# Patient Record
Sex: MALE | Race: OTHER | Employment: OTHER | URBAN - METROPOLITAN AREA
[De-identification: names, ages, dates, MRNs, and addresses within clinical notes are randomized per-mention and may not be internally consistent; named-entity substitution may affect disease eponyms.]

---

## 2019-03-15 ENCOUNTER — HOSPITAL ENCOUNTER (EMERGENCY)
Age: 24
Discharge: HOME | End: 2019-03-15
Attending: EMERGENCY MEDICINE
Payer: OTHER GOVERNMENT

## 2019-03-15 ENCOUNTER — APPOINTMENT (OUTPATIENT)
Dept: GENERAL RADIOLOGY | Age: 24
End: 2019-03-15
Attending: EMERGENCY MEDICINE
Payer: OTHER GOVERNMENT

## 2019-03-15 VITALS
HEART RATE: 87 BPM | TEMPERATURE: 98 F | DIASTOLIC BLOOD PRESSURE: 89 MMHG | OXYGEN SATURATION: 97 % | WEIGHT: 230 LBS | SYSTOLIC BLOOD PRESSURE: 143 MMHG | RESPIRATION RATE: 16 BRPM | HEIGHT: 70 IN | BODY MASS INDEX: 32.93 KG/M2

## 2019-03-15 DIAGNOSIS — R51.9 HEADACHE, UNSPECIFIED HEADACHE TYPE: ICD-10-CM

## 2019-03-15 DIAGNOSIS — R07.9 ACUTE CHEST PAIN: Primary | ICD-10-CM

## 2019-03-15 LAB
ATRIAL RATE: 78 BPM
CALCULATED P AXIS, ECG09: 28 DEGREES
CALCULATED R AXIS, ECG10: 63 DEGREES
CALCULATED T AXIS, ECG11: 39 DEGREES
DIAGNOSIS, 93000: NORMAL
FLUAV AG NPH QL IA: NEGATIVE
FLUBV AG NOSE QL IA: NEGATIVE
P-R INTERVAL, ECG05: 170 MS
Q-T INTERVAL, ECG07: 374 MS
QRS DURATION, ECG06: 90 MS
QTC CALCULATION (BEZET), ECG08: 426 MS
TROPONIN I BLD-MCNC: <0.04 NG/ML (ref 0–0.08)
VENTRICULAR RATE, ECG03: 78 BPM

## 2019-03-15 PROCEDURE — 71046 X-RAY EXAM CHEST 2 VIEWS: CPT

## 2019-03-15 PROCEDURE — 74011250637 HC RX REV CODE- 250/637: Performed by: EMERGENCY MEDICINE

## 2019-03-15 PROCEDURE — 99284 EMERGENCY DEPT VISIT MOD MDM: CPT

## 2019-03-15 PROCEDURE — 84484 ASSAY OF TROPONIN QUANT: CPT

## 2019-03-15 PROCEDURE — 87804 INFLUENZA ASSAY W/OPTIC: CPT

## 2019-03-15 PROCEDURE — 93005 ELECTROCARDIOGRAM TRACING: CPT

## 2019-03-15 RX ORDER — MAG HYDROX/ALUMINUM HYD/SIMETH 200-200-20
30 SUSPENSION, ORAL (FINAL DOSE FORM) ORAL
Status: COMPLETED | OUTPATIENT
Start: 2019-03-15 | End: 2019-03-15

## 2019-03-15 RX ORDER — ONDANSETRON 4 MG/1
4 TABLET, ORALLY DISINTEGRATING ORAL
Qty: 10 TAB | Refills: 0 | Status: SHIPPED | OUTPATIENT
Start: 2019-03-15 | End: 2020-01-07

## 2019-03-15 RX ORDER — FAMOTIDINE 20 MG/1
20 TABLET, FILM COATED ORAL
Status: COMPLETED | OUTPATIENT
Start: 2019-03-15 | End: 2019-03-15

## 2019-03-15 RX ORDER — IBUPROFEN 800 MG/1
800 TABLET ORAL
Qty: 20 TAB | Refills: 0 | Status: SHIPPED | OUTPATIENT
Start: 2019-03-15 | End: 2019-03-22

## 2019-03-15 RX ORDER — ACETAMINOPHEN 500 MG
1000 TABLET ORAL
Qty: 40 TAB | Refills: 0 | Status: SHIPPED | OUTPATIENT
Start: 2019-03-15

## 2019-03-15 RX ORDER — IBUPROFEN 600 MG/1
600 TABLET ORAL
Status: COMPLETED | OUTPATIENT
Start: 2019-03-15 | End: 2019-03-15

## 2019-03-15 RX ORDER — ACETAMINOPHEN 500 MG
1000 TABLET ORAL
Status: COMPLETED | OUTPATIENT
Start: 2019-03-15 | End: 2019-03-15

## 2019-03-15 RX ADMIN — ACETAMINOPHEN 1000 MG: 500 TABLET, FILM COATED ORAL at 02:24

## 2019-03-15 RX ADMIN — ALUMINUM HYDROXIDE, MAGNESIUM HYDROXIDE, AND SIMETHICONE 30 ML: 200; 200; 20 SUSPENSION ORAL at 02:24

## 2019-03-15 RX ADMIN — IBUPROFEN 600 MG: 600 TABLET, FILM COATED ORAL at 02:24

## 2019-03-15 RX ADMIN — FAMOTIDINE 20 MG: 20 TABLET ORAL at 02:24

## 2019-03-15 NOTE — ED PROVIDER NOTES
Frankey Friar is a 20 y.o. Male is is active [employer] is otherwise healthy with complaints of onset of nausea and vomiting chest discomfort not feeling well that occurred after work yesterday. Patient states he still has some chest discomfort which went to his back. The pain is now better. He denies any current nausea or vomiting abdominal pain. He does have a mild headache as well. He denies any sore throat congestion runny nose, cough. No urinary symptoms. Patient was just concerned because he did not want the symptoms returned again. The history is provided by the patient. History reviewed. No pertinent past medical history. History reviewed. No pertinent surgical history. History reviewed. No pertinent family history. Social History     Socioeconomic History    Marital status: SINGLE     Spouse name: Not on file    Number of children: Not on file    Years of education: Not on file    Highest education level: Not on file   Social Needs    Financial resource strain: Not on file    Food insecurity - worry: Not on file    Food insecurity - inability: Not on file    Transportation needs - medical: Not on file   [employer] needs - non-medical: Not on file   Occupational History    Not on file   Tobacco Use    Smoking status: Never Smoker    Smokeless tobacco: Never Used   Substance and Sexual Activity    Alcohol use: No     Frequency: Never    Drug use: No    Sexual activity: Not on file   Other Topics Concern    Not on file   Social History Narrative    Not on file         ALLERGIES: Patient has no known allergies. Review of Systems   Constitutional: Negative for chills, diaphoresis and fever. HENT: Negative for sore throat and trouble swallowing. Eyes: Negative for visual disturbance. Respiratory: Negative for cough and shortness of breath. Cardiovascular: Positive for chest pain.    Gastrointestinal: Negative for abdominal pain.   Endocrine: Negative for polyuria. Genitourinary: Negative for difficulty urinating and dysuria. Musculoskeletal: Negative for gait problem. Skin: Negative for rash. Allergic/Immunologic: Negative for immunocompromised state. Neurological: Negative for syncope. Psychiatric/Behavioral: Positive for sleep disturbance. Vitals:    03/15/19 0127   BP: 143/89   Pulse: 87   Resp: 16   Temp: 98 °F (36.7 °C)   SpO2: 97%   Weight: 104.3 kg (230 lb)   Height: 5' 10\" (1.778 m)            Physical Exam   Constitutional: He is oriented to person, place, and time. He appears well-developed and well-nourished. Non-toxic appearance. He does not appear ill. No distress. HENT:   Head: Normocephalic and atraumatic. Right Ear: External ear normal.   Left Ear: External ear normal.   Nose: Nose normal.   Mouth/Throat: Oropharynx is clear and moist. No oropharyngeal exudate. Eyes: Conjunctivae and EOM are normal. Pupils are equal, round, and reactive to light. Right eye exhibits no discharge. Left eye exhibits no discharge. No scleral icterus. Neck: Normal range of motion. Cardiovascular: Normal rate, regular rhythm, normal heart sounds and intact distal pulses. Pulmonary/Chest: Effort normal and breath sounds normal. No respiratory distress. Abdominal: Soft. There is no tenderness. Musculoskeletal: Normal range of motion. He exhibits no edema. Lymphadenopathy:     He has no cervical adenopathy. Neurological: He is alert and oriented to person, place, and time. Skin: Skin is warm and dry. Capillary refill takes less than 2 seconds. He is not diaphoretic. Psychiatric: His behavior is normal.   Nursing note and vitals reviewed.        MDM       Procedures  Vitals:  Patient Vitals for the past 12 hrs:   Temp Pulse Resp BP SpO2   03/15/19 0127 98 °F (36.7 °C) 87 16 143/89 97 %         Medications ordered:   Medications   ibuprofen (MOTRIN) tablet 600 mg (600 mg Oral Given 3/15/19 0224) acetaminophen (TYLENOL) tablet 1,000 mg (1,000 mg Oral Given 3/15/19 0224)   alum-mag hydroxide-simeth (MYLANTA) oral suspension 30 mL (30 mL Oral Given 3/15/19 0224)   famotidine (PEPCID) tablet 20 mg (20 mg Oral Given 3/15/19 0224)         Lab findings:  Recent Results (from the past 12 hour(s))   POC TROPONIN-I    Collection Time: 03/15/19  2:33 AM   Result Value Ref Range    Troponin-I (POC) <0.04 0.00 - 0.08 ng/mL       EKG interpretation by ED Physician:  Normal sinus rhythm with early repolarization. Rate 78  QTc 426  No previous    X-Ray, CT or other radiology findings or impressions:  XR CHEST PA LAT    (Results Pending)     Normal per my interpretation  Progress notes, Consult notes or additional Procedure notes:   Doubt primary cardiac issue. Most likely consistent with a viral illness. Doubt need for further workup. I have discussed with patient and/or family/sig other the results, interpretation of any imaging if performed, suspected diagnosis and treatment plan to include instructions regarding the diagnoses listed to which understanding was expressed with all questions answered      Reevaluation of patient:   stable    Disposition:  Diagnosis:   1. Acute chest pain    2. Headache, unspecified headache type        Disposition: home      Follow-up Information     Follow up With Specialties Details Why Contact Info    follow up with [facility] medical section tomorrow for recheck                   Medication List      START taking these medications    acetaminophen 500 mg tablet  Commonly known as:  TYLENOL EXTRA STRENGTH  Take 2 Tabs by mouth every six (6) hours as needed for Pain. ibuprofen 800 mg tablet  Commonly known as:  MOTRIN  Take 1 Tab by mouth every eight (8) hours as needed for Pain (with food) for up to 7 days. ondansetron 4 mg disintegrating tablet  Commonly known as:  ZOFRAN ODT  Take 1 Tab by mouth every eight (8) hours as needed for Nausea.            Where to Get Your Medications      Information about where to get these medications is not yet available    Ask your nurse or doctor about these medications  · acetaminophen 500 mg tablet  · ibuprofen 800 mg tablet  · ondansetron 4 mg disintegrating tablet

## 2019-03-15 NOTE — ED NOTES
I have reviewed discharge instructions with the patient. The patient verbalized understanding. Patient armband removed and shredded    Patient ambulatory to ED lobby.

## 2019-03-15 NOTE — DISCHARGE INSTRUCTIONS
Patient Education        Chest Pain: Care Instructions  Your Care Instructions    There are many things that can cause chest pain. Some are not serious and will get better on their own in a few days. But some kinds of chest pain need more testing and treatment. Your doctor may have recommended a follow-up visit in the next 8 to 12 hours. If you are not getting better, you may need more tests or treatment. Even though your doctor has released you, you still need to watch for any problems. The doctor carefully checked you, but sometimes problems can develop later. If you have new symptoms or if your symptoms do not get better, get medical care right away. If you have worse or different chest pain or pressure that lasts more than 5 minutes or you passed out (lost consciousness), call 911 or seek other emergency help right away. A medical visit is only one step in your treatment. Even if you feel better, you still need to do what your doctor recommends, such as going to all suggested follow-up appointments and taking medicines exactly as directed. This will help you recover and help prevent future problems. How can you care for yourself at home? · Rest until you feel better. · Take your medicine exactly as prescribed. Call your doctor if you think you are having a problem with your medicine. · Do not drive after taking a prescription pain medicine. When should you call for help? Call 911 if:    · You passed out (lost consciousness).     · You have severe difficulty breathing.     · You have symptoms of a heart attack. These may include:  ? Chest pain or pressure, or a strange feeling in your chest.  ? Sweating. ? Shortness of breath. ? Nausea or vomiting. ? Pain, pressure, or a strange feeling in your back, neck, jaw, or upper belly or in one or both shoulders or arms. ? Lightheadedness or sudden weakness. ? A fast or irregular heartbeat.   After you call 911, the  may tell you to chew 1 adult-strength or 2 to 4 low-dose aspirin. Wait for an ambulance. Do not try to drive yourself.    Call your doctor today if:    · You have any trouble breathing.     · Your chest pain gets worse.     · You are dizzy or lightheaded, or you feel like you may faint.     · You are not getting better as expected.     · You are having new or different chest pain. Where can you learn more? Go to http://patricia-ella.info/. Enter A120 in the search box to learn more about \"Chest Pain: Care Instructions. \"  Current as of: September 23, 2018  Content Version: 11.9  © 2931-8127 [employer]. Care instructions adapted under license by [employer] (which disclaims liability or warranty for this information). If you have questions about a medical condition or this instruction, always ask your healthcare professional. [place] [address] any warranty or liability for your use of this information. Patient Education        Headache: Care Instructions  Your Care Instructions    Headaches have many possible causes. Most headaches aren't a sign of a more serious problem, and they will get better on their own. Home treatment may help you feel better faster. The doctor has checked you carefully, but problems can develop later. If you notice any problems or new symptoms, get medical treatment right away. Follow-up care is a key part of your treatment and safety. Be sure to make and go to all appointments, and call your doctor if you are having problems. It's also a good idea to know your test results and keep a list of the medicines you take. How can you care for yourself at home? · Do not drive if you have taken a prescription pain medicine. · Rest in a quiet, dark room until your headache is gone. Close your eyes and try to relax or go to sleep. Don't watch TV or read. · Put a cold, moist cloth or cold pack on the painful area for 10 to 20 minutes at a time.  Put a thin cloth between the cold pack and your skin. · Use a warm, moist towel or a heating pad set on low to relax tight shoulder and neck muscles. · Have someone gently massage your neck and shoulders. · Take pain medicines exactly as directed. ? If the doctor gave you a prescription medicine for pain, take it as prescribed. ? If you are not taking a prescription pain medicine, ask your doctor if you can take an over-the-counter medicine. · Be careful not to take pain medicine more often than the instructions allow, because you may get worse or more frequent headaches when the medicine wears off. · Do not ignore new symptoms that occur with a headache, such as a fever, weakness or numbness, vision changes, or confusion. These may be signs of a more serious problem. To prevent headaches  · Keep a headache diary so you can figure out what triggers your headaches. Avoiding triggers may help you prevent headaches. Record when each headache began, how long it lasted, and what the pain was like (throbbing, aching, stabbing, or dull). Write down any other symptoms you had with the headache, such as nausea, flashing lights or dark spots, or sensitivity to bright light or loud noise. Note if the headache occurred near your period. List anything that might have triggered the headache, such as certain foods (chocolate, cheese, wine) or odors, smoke, bright light, stress, or lack of sleep. · Find healthy ways to deal with stress. Headaches are most common during or right after stressful times. Take time to relax before and after you do something that has caused a headache in the past.  · Try to keep your muscles relaxed by keeping good posture. Check your jaw, face, neck, and shoulder muscles for tension, and try relaxing them. When sitting at a desk, change positions often, and stretch for 30 seconds each hour. · Get plenty of sleep and exercise. · Eat regularly and well.  Long periods without food can trigger a headache. · Treat yourself to a massage. Some people find that regular massages are very helpful in relieving tension. · Limit caffeine by not drinking too much coffee, tea, or soda. But don't quit caffeine suddenly, because that can also give you headaches. · Reduce eyestrain from computers by blinking frequently and looking away from the computer screen every so often. Make sure you have proper eyewear and that your monitor is set up properly, about an arm's length away. · Seek help if you have depression or anxiety. Your headaches may be linked to these conditions. Treatment can both prevent headaches and help with symptoms of anxiety or depression. When should you call for help? Call 911 anytime you think you may need emergency care. For example, call if:    · You have signs of a stroke. These may include:  ? Sudden numbness, paralysis, or weakness in your face, arm, or leg, especially on only one side of your body. ? Sudden vision changes. ? Sudden trouble speaking. ? Sudden confusion or trouble understanding simple statements. ? Sudden problems with walking or balance. ? A sudden, severe headache that is different from past headaches.    Call your doctor now or seek immediate medical care if:    · You have a new or worse headache.     · Your headache gets much worse. Where can you learn more? Go to http://patricia-ella.info/. Enter M271 in the search box to learn more about \"Headache: Care Instructions. \"  Current as of: Lennie 3, 2018  Content Version: 11.9  © 7706-1189 [employer]. Care instructions adapted under license by [employer] (which disclaims liability or warranty for this information). If you have questions about a medical condition or this instruction, always ask your healthcare professional. [place] [address] any warranty or liability for your use of this information.

## 2019-03-15 NOTE — LETTER
NOTIFICATION RETURN TO WORK / SCHOOL 
 
3/15/2019 2:53 AM 
 
Mr. Rupa SOUZA SHWETA Antonio [id number] [facility] [phone] To Whom It May Concern: 
 
Rupa Vogel is currently under the care of [facility] EMERGENCY DEPT. He will return to work/school on: 3/16/19 If there are questions or concerns please have the patient contact our office. Sincerely, Seymour Camarillo MD

## 2019-05-03 ENCOUNTER — APPOINTMENT (OUTPATIENT)
Dept: CT IMAGING | Age: 24
End: 2019-05-03
Attending: PHYSICIAN ASSISTANT
Payer: OTHER GOVERNMENT

## 2019-05-03 ENCOUNTER — HOSPITAL ENCOUNTER (EMERGENCY)
Age: 24
Discharge: HOME | End: 2019-05-03
Attending: EMERGENCY MEDICINE
Payer: OTHER GOVERNMENT

## 2019-05-03 VITALS
TEMPERATURE: 98.3 F | HEART RATE: 81 BPM | SYSTOLIC BLOOD PRESSURE: 133 MMHG | OXYGEN SATURATION: 99 % | DIASTOLIC BLOOD PRESSURE: 70 MMHG | RESPIRATION RATE: 16 BRPM

## 2019-05-03 DIAGNOSIS — S30.0XXA HEMATOMA OF LUMBAR MUSCLE, INITIAL ENCOUNTER: Primary | ICD-10-CM

## 2019-05-03 DIAGNOSIS — M54.50 ACUTE LEFT-SIDED LOW BACK PAIN WITHOUT SCIATICA: ICD-10-CM

## 2019-05-03 LAB
ANION GAP BLD CALC-SCNC: 20 MMOL/L (ref 10–20)
APTT PPP: 25.4 SEC (ref 23–36.4)
BUN BLD-MCNC: 16 MG/DL (ref 7–18)
CA-I BLD-MCNC: 1.2 MMOL/L (ref 1.12–1.32)
CHLORIDE BLD-SCNC: 101 MMOL/L (ref 100–108)
CO2 BLD-SCNC: 28 MMOL/L (ref 19–24)
CREAT UR-MCNC: 1.1 MG/DL (ref 0.6–1.3)
GLUCOSE BLD STRIP.AUTO-MCNC: 65 MG/DL (ref 74–106)
HCT VFR BLD CALC: 40 % (ref 36–49)
HGB BLD-MCNC: 13.6 G/DL (ref 12–16)
INR PPP: 1.1 (ref 0.8–1.2)
POTASSIUM BLD-SCNC: 3.7 MMOL/L (ref 3.5–5.5)
PROTHROMBIN TIME: 13.6 SEC (ref 11.5–15.2)
SODIUM BLD-SCNC: 144 MMOL/L (ref 136–145)

## 2019-05-03 PROCEDURE — 99283 EMERGENCY DEPT VISIT LOW MDM: CPT

## 2019-05-03 PROCEDURE — 80047 BASIC METABLC PNL IONIZED CA: CPT

## 2019-05-03 PROCEDURE — 74011636320 HC RX REV CODE- 636/320: Performed by: EMERGENCY MEDICINE

## 2019-05-03 PROCEDURE — 85610 PROTHROMBIN TIME: CPT

## 2019-05-03 PROCEDURE — 85730 THROMBOPLASTIN TIME PARTIAL: CPT

## 2019-05-03 PROCEDURE — 74177 CT ABD & PELVIS W/CONTRAST: CPT

## 2019-05-03 RX ORDER — NAPROXEN 500 MG/1
500 TABLET ORAL 2 TIMES DAILY WITH MEALS
Qty: 20 TAB | Refills: 0 | Status: SHIPPED | OUTPATIENT
Start: 2019-05-03 | End: 2019-05-13

## 2019-05-03 RX ORDER — CYCLOBENZAPRINE HCL 5 MG
5 TABLET ORAL
Qty: 5 TAB | Refills: 0 | Status: SHIPPED | OUTPATIENT
Start: 2019-05-03 | End: 2019-05-08

## 2019-05-03 RX ADMIN — IOPAMIDOL 93 ML: 612 INJECTION, SOLUTION INTRAVENOUS at 15:09

## 2019-05-03 NOTE — DISCHARGE INSTRUCTIONS
Patient Education        Back Pain: Care Instructions  Your Care Instructions    Back pain has many possible causes. It is often related to problems with muscles and ligaments of the back. It may also be related to problems with the nerves, discs, or bones of the back. Moving, lifting, standing, sitting, or sleeping in an awkward way can strain the back. Sometimes you don't notice the injury until later. Arthritis is another common cause of back pain. Although it may hurt a lot, back pain usually improves on its own within several weeks. Most people recover in 12 weeks or less. Using good home treatment and being careful not to stress your back can help you feel better sooner. Follow-up care is a key part of your treatment and safety. Be sure to make and go to all appointments, and call your doctor if you are having problems. It's also a good idea to know your test results and keep a list of the medicines you take. How can you care for yourself at home? · Sit or lie in positions that are most comfortable and reduce your pain. Try one of these positions when you lie down:  ? Lie on your back with your knees bent and supported by large pillows. ? Lie on the floor with your legs on the seat of a sofa or chair. ? Lie on your side with your knees and hips bent and a pillow between your legs. ? Lie on your stomach if it does not make pain worse. · Do not sit up in bed, and avoid soft couches and twisted positions. Bed rest can help relieve pain at first, but it delays healing. Avoid bed rest after the first day of back pain. · Change positions every 30 minutes. If you must sit for long periods of time, take breaks from sitting. Get up and walk around, or lie in a comfortable position. · Try using a heating pad on a low or medium setting for 15 to 20 minutes every 2 or 3 hours. Try a warm shower in place of one session with the heating pad. · You can also try an ice pack for 10 to 15 minutes every 2 to 3 hours. Put a thin cloth between the ice pack and your skin. · Take pain medicines exactly as directed. ? If the doctor gave you a prescription medicine for pain, take it as prescribed. ? If you are not taking a prescription pain medicine, ask your doctor if you can take an over-the-counter medicine. · Take short walks several times a day. You can start with 5 to 10 minutes, 3 or 4 times a day, and work up to longer walks. Walk on level surfaces and avoid hills and stairs until your back is better. · Return to work and other activities as soon as you can. Continued rest without activity is usually not good for your back. · To prevent future back pain, do exercises to stretch and strengthen your back and stomach. Learn how to use good posture, safe lifting techniques, and proper body mechanics. When should you call for help? Call your doctor now or seek immediate medical care if:    · You have new or worsening numbness in your legs.     · You have new or worsening weakness in your legs. (This could make it hard to stand up.)     · You lose control of your bladder or bowels.    Watch closely for changes in your health, and be sure to contact your doctor if:    · You have a fever, lose weight, or don't feel well.     · You do not get better as expected. Where can you learn more? Go to http://patricia-ella.info/. Enter X967 in the search box to learn more about \"Back Pain: Care Instructions. \"  Current as of: September 20, 2018  Content Version: 11.9  © 2439-3970 [employer]. Care instructions adapted under license by [employer] (which disclaims liability or warranty for this information). If you have questions about a medical condition or this instruction, always ask your healthcare professional. [place] [address] any warranty or liability for your use of this information.          Patient Education        Low Back Contusion: Care Instructions  Your Care Instructions    Contusion is the medical term for a bruise. When you have a low back bruise, it's often caused by a direct blow or an impact, such as falling against a counter or table. Bruises are common sports injuries. Most people think of a bruise as a black-and-blue spot. This happens when small blood vessels get torn and leak blood under the skin. But bones, muscles, and organs can also get bruised. If these deep tissues are damaged, you may not always see a bruise. The doctor will examine your bruise. You may also have tests to make sure you do not have a more serious injury, such as a broken bone or nerve damage. Tests may include X-rays or other imaging tests like a CT scan or MRI. Low back bruises may cause pain and swelling. But if there is no serious damage, they will often get better with home treatment in several days to a few weeks. The doctor has checked you carefully, but problems can develop later. If you notice any problems or new symptoms, get medical treatment right away. Follow-up care is a key part of your treatment and safety. Be sure to make and go to all appointments, and call your doctor if you are having problems. It's also a good idea to know your test results and keep a list of the medicines you take. How can you care for yourself at home? · Put ice or a cold pack on the sore area for 10 to 20 minutes at a time to stop swelling. Put a thin cloth between the ice pack and your skin. · Be safe with medicines. Read and follow all instructions on the label. ? If the doctor gave you a prescription medicine for pain, take it as prescribed. ? If you are not taking a prescription pain medicine, ask your doctor if you can take an over-the-counter medicine. · For the first day or two of pain, take it easy. But as soon as possible, get back to your normal daily life and activities. · Get gentle exercise, such as walking.  Movement keeps your spine flexible and helps your muscles stay strong. When should you call for help? Call 911 anytime you think you may need emergency care. For example, call if:    · You are unable to move a leg at all.   [facility] your doctor now or seek immediate medical care if:    · You have new or worse symptoms in your legs or buttocks. Symptoms may include:  ? Numbness or tingling. ? Weakness. ? Pain.     · You lose bladder or bowel control.     · You have blood in your urine.    Watch closely for changes in your health, and be sure to contact your doctor if:    · You do not get better as expected. Where can you learn more? Go to http://patricia-ella.info/. Enter V337 in the search box to learn more about \"Low Back Contusion: Care Instructions. \"  Current as of: September 23, 2018  Content Version: 11.9  © 0760-1938 [employer]. Care instructions adapted under license by [employer] (which disclaims liability or warranty for this information). If you have questions about a medical condition or this instruction, always ask your healthcare professional. [place] [address] any warranty or liability for your use of this information. Patient Education        Learning About Low Back Pain  What is low back pain? Low back pain is pain that can occur anywhere below the ribs and above the legs. It is very common. Almost everyone has it at one time or another. Low back pain can be:  Acute. This is new pain that can last a few days to a few weeks--at the most a few months. Chronic. This pain can last for more than a few months. Sometimes it can last for years. What are some myths about low back pain? Here are some common myths about low back pain--and the facts:  Myth: \"I need to rest my back when I have back pain. \"  Fact: Staying active won't hurt you. It may help you get better faster. Myth: \"I need prescription pain medicine. \"  Fact: It's best to try to let time and being active heal your back. Opioid pain medicines--such as hydrocodone or oxycodone--usually don't work any better than over-the-counter medicines like ibuprofen or naproxen. And opioids can cause serious problems like addiction or overdose. Myth: \"I need a test like an X-ray or an MRI to diagnose my low back pain. \"  Fact: Getting a test right away won't help you get better faster. And it could lead you down a treatment path you may not need, since most people get better on their own. What causes low back pain? In most cases, there isn't a clear cause. This can be frustrating, because your back hurts and there's no obvious reason. Your back pain can be caused by:  Overuse or muscle strain. This can happen from playing sports, lifting heavy things, or not being physically fit. A herniated disc. This is a problem with the cushion between the bones in your back. Arthritis. With age, you may have changes in your bones that can narrow the space around your nerves. Other causes. In rare cases, the cause is a serious illness like an infection or cancer. But there are usually other symptoms too. What are the symptoms? Your symptoms depend on your body and the cause of your back pain. You may feel:  · Pain that's sharp or dull. It may be in one small area or over a broad area. But even bad pain doesn't mean that it's caused by something serious. · Leg pain, numbness, or tingling. When a nerve gets squeezed--such as from a disc problem or arthritis--you may have symptoms in your leg or foot. You can even have leg symptoms from a back problem without having any pain in your back. How is low back pain diagnosed? A physical exam is the main way to diagnose low back pain. Your doctor may examine your back, check your nerves by testing your reflexes, and make sure that your muscles are strong. He or she also will ask questions about your back and overall health. Most people don't need any tests right away.  Tests often don't show the reason for your pain. If your pain lasts more than 6 weeks or you have symptoms that your doctor is more concerned about, he or she may order tests. These may include an X-ray, a CT scan, or an MRI. In some cases, tests can help your doctor find a cause for your pain, especially for pain in one or both legs. How is low back pain treated? Most acute low back pain gets better on its own within a few weeks, no matter what the cause. Time and doing usual activities are all that most people need to feel better. Using heat or ice and taking over-the-counter pain medicine also can help while your body heals. If you aren't getting better on your own or your pain is very bad, your doctor may recommend:  · Physical therapy. · Spinal manipulation, such as by a chiropractor. · Acupuncture. · Massage. · Injections of steroid medicine in your back (especially for pain that involves your legs). If you have chronic low back pain, treatment will help you understand and manage your pain. Treatment may include:  · Staying active. This may include walking or doing back exercises. · Physical therapy. · Medicines. Some of these medicines are also used for other problems, like depression. · Pain management. Your doctor may have you see a pain specialist.  · Counseling. Having chronic pain can be hard. It may help to talk to someone who can help you cope with your pain. Surgery isn't needed for most people. But it may help some types of low back pain. Follow-up care is a key part of your treatment and safety. Be sure to make and go to all appointments, and call your doctor if you are having problems. It's also a good idea to know your test results and keep a list of the medicines you take. When should you call for help? Call 911 anytime you think you may need emergency care. For example, call if:  · You can't move a leg at all.   Call your doctor now or seek immediate medical care if:  · You have new or worse symptoms in your legs, belly, or buttocks. Symptoms may include:  ? Numbness or tingling. ? Weakness. ? Pain. · You lose bladder or bowel control. Watch closely for changes in your health, and be sure to contact your doctor if:  · Along with the back pain, you have a fever, lose weight, or don't feel well. · You do not get better as expected. Where can you learn more? Go to http://patricia-ella.info/. Enter A007 in the search box to learn more about \"Learning About Low Back Pain. \"  Current as of: September 20, 2018  Content Version: 11.9  © 9930-4865 [employer]. Care instructions adapted under license by [employer] (which disclaims liability or warranty for this information). If you have questions about a medical condition or this instruction, always ask your healthcare professional. [address] any warranty or liability for your use of this information.

## 2019-05-03 NOTE — ED NOTES
Assume care of patient, patient with swelling to right tonsil area, patient able to swallow water and patient not having any problems maintaining his airway

## 2019-05-03 NOTE — LETTER
NOTIFICATION RETURN TO WORK / SCHOOL 
 
5/3/2019 3:55 PM 
 
Mr. Sary Garcia [id number] [facility] [id number] To Whom It May Concern: 
 
Sary Garcia is currently under the care of [facility] EMERGENCY DEPT. He will return to work/school on: 5/6/19 If there are questions or concerns please have the patient contact our office. Sincerely, Jalen Parish PA-C

## 2019-05-03 NOTE — ED NOTES
Patient reading at this time, no difficulty with airway, swelling has not improves or worsened at this time

## 2019-05-08 NOTE — ED PROVIDER NOTES
EMERGENCY DEPARTMENT HISTORY AND PHYSICAL EXAM 
 
Date: 5/3/2019 Patient Name: Laura Mack History of Presenting Illness Chief Complaint Patient presents with  Back Pain History Provided By: patient Additional History (Context): Laura Mack is a 78-year-old male here with left lower back pain after doing dead weights yesterday. Patient states he noticed a bulge to this area after exercising and has had pain since. No radiation of pain, no numbness tingling, saddle anesthesia. Patient states he has never had this type of pain before. Has not taken any medications prior to ED arrival.  No other complaints at this time PCP: None Current Outpatient Medications Medication Sig Dispense Refill  cyclobenzaprine (FLEXERIL) 5 mg tablet Take 1 Tab by mouth nightly for 5 days. 5 Tab 0  
 naproxen (NAPROSYN) 500 mg tablet Take 1 Tab by mouth two (2) times daily (with meals) for 10 days. 20 Tab 0  
 acetaminophen (TYLENOL EXTRA STRENGTH) 500 mg tablet Take 2 Tabs by mouth every six (6) hours as needed for Pain. 40 Tab 0  
 ondansetron (ZOFRAN ODT) 4 mg disintegrating tablet Take 1 Tab by mouth every eight (8) hours as needed for Nausea. 10 Tab 0 Past History Past Medical History: No past medical history on file. Past Surgical History: No past surgical history on file. Family History: No family history on file. Social History: 
Social History Tobacco Use  Smoking status: Never Smoker  Smokeless tobacco: Never Used Substance Use Topics  Alcohol use: No  
  Frequency: Never  Drug use: No  
 
 
Allergies: 
No Known Allergies Review of Systems Review of Systems Constitutional: Negative for chills and fever. HENT: Negative for nasal congestion, sore throat, rhinorrhea Eyes: Negative. Respiratory: Negative for cough and negative for shortness of breath. Cardiovascular: Negative for chest pain and palpitations. Gastrointestinal: Negative for abdominal pain, constipation, diarrhea, nausea and vomiting. Genitourinary: Negative. Negative for difficulty urinating and flank pain. Musculoskeletal: Negative for back pain. Negative for gait problem and neck pain. Positive for muscle pain Skin: Negative. Allergic/Immunologic: Negative. Neurological: Negative for dizziness, weakness, numbness and headaches. Psychiatric/Behavioral: Negative. All other systems reviewed and are negative. All Other Systems Negative Physical Exam  
 
Vitals:  
 05/03/19 1323 05/03/19 1530 BP: 135/71 133/70 Pulse: 83 81 Resp: 16 16 Temp: 98.2 °F (36.8 °C) 98.3 °F (36.8 °C) SpO2: 98% 99% Physical Exam  
Constitutional: He is oriented to person, place, and time. He appears well-developed and well-nourished. No distress. HENT:  
Head: Normocephalic and atraumatic. Nose: Nose normal.  
Eyes: Pupils are equal, round, and reactive to light. Conjunctivae and EOM are normal.  
Neck: Normal range of motion. Neck supple. Cardiovascular: Normal rate and regular rhythm. Pulmonary/Chest: Effort normal and breath sounds normal. No respiratory distress. Abdominal: Soft. Musculoskeletal: Normal range of motion. Lumbar back: He exhibits tenderness, swelling, pain and spasm. He exhibits no bony tenderness, no edema, no deformity, no laceration and normal pulse. Back: 
 
Non tender to midline palpation of the cervical, thoracic, and lumbar spine. No step off or deformity. FROM of BUE and BLE against resistance in flexion and extension with 5/5 strength. Non tender to bilateral shoulders/elbows/hands/hips/knees/ankles. Pulses intact and equal.   
  
Neurological: He is alert and oriented to person, place, and time. No cranial nerve deficit. Coordination normal.  
Skin: Skin is warm. No rash noted. He is not diaphoretic. Psychiatric: He has a normal mood and affect. His behavior is normal.  
Nursing note and vitals reviewed. Diagnostic Study Results Labs - No results found for this or any previous visit (from the past 12 hour(s)). Radiologic Studies -  
CT ABD PELV W CONT Final Result IMPRESSION:  
  
1. No acute intra-abdominal or pelvic abnormality. 2. Mild subcutaneous bruising along the left lateral flank. No associated  
osseous abnormality. CT Results  (Last 48 hours) None CXR Results  (Last 48 hours) None Medical Decision Making I am the first provider for this patient. I reviewed the vital signs, available nursing notes, past medical history, past surgical history, family history and social history. Vital Signs-Reviewed the patient's vital signs. Records Reviewed: Nursing notes, old medical records and any previous labs, imaging, visits, consultations pertinent to patient care Procedures: 
Procedures Provider Notes (Medical Decision Making): 17-year-old male here with left lower back pain noted bulge in this area on exam however does not appear to be a muscle spasm. Remaining exam including detailed neuro within normal limits. Discussed with Dr. Kristy Garay, he recommends CT with contrast of abdomen and pelvis to rule out tear of the muscle, hematoma, low suspicion for abscess as this just developed yesterday. Basic labs within normal limits CAT scan negative for any acute process. Will discharge home with muscle strain/spasm supportive treatment and PCP/Ortho follow-up. MED RECONCILIATION: 
No current facility-administered medications for this encounter. Current Outpatient Medications Medication Sig  cyclobenzaprine (FLEXERIL) 5 mg tablet Take 1 Tab by mouth nightly for 5 days.  naproxen (NAPROSYN) 500 mg tablet Take 1 Tab by mouth two (2) times daily (with meals) for 10 days.  acetaminophen (TYLENOL EXTRA STRENGTH) 500 mg tablet Take 2 Tabs by mouth every six (6) hours as needed for Pain.  ondansetron (ZOFRAN ODT) 4 mg disintegrating tablet Take 1 Tab by mouth every eight (8) hours as needed for Nausea. Disposition: 
home DISCHARGE NOTE:  
 
Pt has been reexamined. Patient has no new complaints, changes, or physical findings. Care plan outlined and precautions discussed. Discussed proper way to take medications. Discussed treatment plan, return precautions, symptomatic relief, and expected time to improvement. All questions answered. Patient is stable for discharge and outpatient management. Patient is ready to go home. Follow-up Information Follow up With Specialties Details Why Contact Info [facility] EMERGENCY DEPT Emergency Medicine   [address] 
[phone] [address]) [address] [phone] Discharge Medication List as of 5/3/2019  3:53 PM  
  
START taking these medications Details  
cyclobenzaprine (FLEXERIL) 5 mg tablet Take 1 Tab by mouth nightly for 5 days. , Print, Disp-5 Tab, R-0  
  
naproxen (NAPROSYN) 500 mg tablet Take 1 Tab by mouth two (2) times daily (with meals) for 10 days. , Print, Disp-20 Tab, R-0  
  
  
CONTINUE these medications which have NOT CHANGED Details  
acetaminophen (TYLENOL EXTRA STRENGTH) 500 mg tablet Take 2 Tabs by mouth every six (6) hours as needed for Pain., Print, Disp-40 Tab, R-0  
  
ondansetron (ZOFRAN ODT) 4 mg disintegrating tablet Take 1 Tab by mouth every eight (8) hours as needed for Nausea. , Print, Disp-10 Tab, R-0 Diagnosis Clinical Impression: 1. Hematoma of lumbar muscle, initial encounter 2. Acute left-sided low back pain without sciatica

## 2020-01-07 ENCOUNTER — HOSPITAL ENCOUNTER (EMERGENCY)
Age: 25
Discharge: HOME | End: 2020-01-07
Attending: EMERGENCY MEDICINE
Payer: OTHER GOVERNMENT

## 2020-01-07 VITALS
BODY MASS INDEX: 32.93 KG/M2 | SYSTOLIC BLOOD PRESSURE: 140 MMHG | RESPIRATION RATE: 17 BRPM | OXYGEN SATURATION: 98 % | DIASTOLIC BLOOD PRESSURE: 87 MMHG | HEART RATE: 83 BPM | HEIGHT: 70 IN | TEMPERATURE: 99.1 F | WEIGHT: 230 LBS

## 2020-01-07 DIAGNOSIS — J02.9 ACUTE PHARYNGITIS, UNSPECIFIED ETIOLOGY: Primary | ICD-10-CM

## 2020-01-07 DIAGNOSIS — B00.1 COLD SORE: ICD-10-CM

## 2020-01-07 PROCEDURE — 74011250637 HC RX REV CODE- 250/637: Performed by: PHYSICIAN ASSISTANT

## 2020-01-07 PROCEDURE — 99282 EMERGENCY DEPT VISIT SF MDM: CPT

## 2020-01-07 RX ORDER — PENICILLIN V POTASSIUM 500 MG/1
TABLET, FILM COATED ORAL 4 TIMES DAILY
COMMUNITY

## 2020-01-07 RX ORDER — PSEUDOEPHEDRINE HCL 30 MG
TABLET ORAL
COMMUNITY

## 2020-01-07 RX ORDER — DEXAMETHASONE SODIUM PHOSPHATE 4 MG/ML
10 INJECTION, SOLUTION INTRA-ARTICULAR; INTRALESIONAL; INTRAMUSCULAR; INTRAVENOUS; SOFT TISSUE
Status: COMPLETED | OUTPATIENT
Start: 2020-01-07 | End: 2020-01-07

## 2020-01-07 RX ADMIN — DEXAMETHASONE SODIUM PHOSPHATE 10 MG: 4 INJECTION, SOLUTION INTRAMUSCULAR; INTRAVENOUS at 12:15

## 2020-01-07 NOTE — ED TRIAGE NOTES
Dx by [facility] medical with strep throat. Has had sore throat and fever. Now has sores in mouth.  Continues to have fever

## 2020-01-10 NOTE — ED PROVIDER NOTES
[address] [facility] EMERGENCY DEPT    Date: 1/7/2020  Patient Name: Dina Snyder    History of Presenting Illness     Chief Complaint   Patient presents with    Sore Throat    Gum Problem       30 y.o. male otherwise healthy presents the ED complaining of a sore throat, fever, and sores in his mouth. Patient states he was diagnosed yesterday with strep throat, prescribed penicillin. States today he just developed some painful sores in his mouth. He notes having a low-grade fever as well. Denies any difficulty breathing or swallowing, drooling, shortness of breath, other symptoms at this time. Patient denies any other associated signs or symptoms. Patient denies any other complaints. Nursing notes regarding the HPI and triage nursing notes were reviewed. Prior medical records were reviewed. Current Outpatient Medications   Medication Sig Dispense Refill    penicillin v potassium (VEETID) 500 mg tablet Take  by mouth four (4) times daily.  pseudoephedrine (SUDAFED) 30 mg tablet Take  by mouth every four (4) hours as needed for Congestion.  magic mouthwash (MOODY) susp Take 5 mL by mouth every six (6) hours as needed for Pain. Swish and swallow. 130 mL 0    acetaminophen (TYLENOL EXTRA STRENGTH) 500 mg tablet Take 2 Tabs by mouth every six (6) hours as needed for Pain. 40 Tab 0       Past History     Past Medical History:  History reviewed. No pertinent past medical history. Past Surgical History:  History reviewed. No pertinent surgical history. Family History:  History reviewed. No pertinent family history. Social History:  Social History     Tobacco Use    Smoking status: Never Smoker    Smokeless tobacco: Never Used   Substance Use Topics    Alcohol use: No     Frequency: Never    Drug use: No       Allergies:  No Known Allergies    Patient's primary care provider (as noted in EPIC):  None    Review of Systems   Constitutional:  + fever, chills. ENMT:  + sore throat, painful mouth sores. Neck:  Denies injury or pain. Respiratory:  Denies cough, wheezing, difficulty breathing, shortness of breath. Neuro:  Denies headache, LOC, dizziness, neurologic symptoms/deficits/paresthesias. Skin: Denies injury, rash, itching or skin changes. All other systems negative as reviewed. Visit Vitals  /87 (BP 1 Location: Right arm, BP Patient Position: At rest)   Pulse 83   Temp 99.1 °F (37.3 °C)   Resp 17   Ht 5' 10\" (1.778 m)   Wt 104.3 kg (230 lb)   SpO2 98%   BMI 33.00 kg/m²       PHYSICAL EXAM:    CONSTITUTIONAL:  Alert, in no apparent distress;  well developed;  well nourished. HEAD:  Normocephalic, atraumatic. EYES:  EOMI. Non-icteric sclera. Normal conjunctiva. ENTM:  Nose: Nasal turbinates erythematous and edematous, no rhinorrhea. Throat:  Noted tonsillar erythema and exudates, mucous membranes moist.  Uvula midline, airway patent. Mouth: Approximately five 2 mm superficial ulcers noted to right buccal mucosa and left inferior lip mucosa. NECK:  Supple. Tender anterior cervical lymphadenopathy. RESPIRATORY:  Chest clear, equal breath sounds, good air movement. CARDIOVASCULAR:  Regular rate and rhythm. No murmurs, rubs, or gallops. NEURO:  Moves all four extremities, and grossly normal motor exam.  SKIN:  No rashes;  Normal for age. PSYCH:  Alert and normal affect. DIFFERENTIAL DIAGNOSES/ MEDICAL DECISION MAKING:  Viral pharyngitis, streptococcal pharyngitis, peritonsillar or other oropharyngeal abscesses, result of post nasal drip, hand-foot-mouth disease, aphthous ulcers in oropharynx, and/or a combination of the above and lesser etiologies. ED COURSE:      ED Course:  Given this, as well as the patients presentation, I believe that the patient has acute strep tonsillopharyngitis. He has 4/4 centor criteria, pt already on abx. It appears that he has cold sores as well, patient already taking Tylenol and Sudafed.   Rx provided for Magic mouthwash. Patient to follow-up with his regular doctor. MDM:  There is no airway compromise. No stridor. Patient is safe for discharge home. Diagnosis:   1. Acute pharyngitis, unspecified etiology    2. Cold sore      Disposition: Discharge    Follow-up Information     Follow up With Specialties Details Why Contact Info    Your primary care doctor   In 3 days      [facility] EMERGENCY DEPT Emergency Medicine  If symptoms worsen [address]  [phone]          Discharge Medication List as of 1/7/2020 12:13 PM      START taking these medications    Details   magic mouthwash (MOODY) susp Take 5 mL by mouth every six (6) hours as needed for Pain. Swish and swallow. , Print, Disp-130 mL, R-0         CONTINUE these medications which have NOT CHANGED    Details   penicillin v potassium (VEETID) 500 mg tablet Take  by mouth four (4) times daily. , Historical Med      pseudoephedrine (SUDAFED) 30 mg tablet Take  by mouth every four (4) hours as needed for Congestion. , Historical Med      acetaminophen (TYLENOL EXTRA STRENGTH) 500 mg tablet Take 2 Tabs by mouth every six (6) hours as needed for Pain., Print, Disp-40 Tab, R-0           ANDERS Phelps